# Patient Record
Sex: MALE | Race: BLACK OR AFRICAN AMERICAN | URBAN - METROPOLITAN AREA
[De-identification: names, ages, dates, MRNs, and addresses within clinical notes are randomized per-mention and may not be internally consistent; named-entity substitution may affect disease eponyms.]

---

## 2024-10-17 ENCOUNTER — TELEPHONE (OUTPATIENT)
Age: 44
End: 2024-10-17

## 2024-10-17 NOTE — TELEPHONE ENCOUNTER
Referral for excessive gas. Please review and advise.      Referral  Referral # [id number]  Referral Information    Referral # Creation Date Referral Status Status Update    [id number] 10/17/2024 Pending Review 10/17/2024: Status History     Status Reason Referral Type Referral Reasons Referral Class   Pending Physician Review Eval and Treat Specialty Services Required Incoming     To Specialty To Provider To Location/Place of Service To Department   Gastroenterology Chris Cartagena MD none [facility] - GASTROENTEROLOGY     To Vendor Referred By By Location/Place of Service By Department   none Martin Panchal MD none none     Priority Start Date Expiration Date Referral Entered By   Routine 10/17/2024 10/17/2025 Kisha Elizabeth MA     Visits Requested Visits Authorized Visits Completed Visits Scheduled   2 2       Coverages    Payer Plan Auth. Required? Covered? Member # Authorized From Expires Auth # Precert. # Comment   [employer] [facility] [place] [employer] [facility] [place] [id number] 2/1/2022 -- -- -- --     Referral Information    Referral # Creation Date Referral Status Status Update    [id number] 10/17/2024 Pending Review 10/17/2024: Status History     Status Reason Referral Type Referral Reasons Referral Class   Pending Physician Review Eval and Treat Specialty Services Required Incoming     To Specialty To Provider To Location/Place of Service To Department   Gastroenterology Chris Cartagena MD none [facility] - GASTROENTEROLOGY     To Vendor Referred By By Location/Place of Service By Department   none Martin Panchal MD none none     Priority Start Date Expiration Date Referral Entered By   Routine 10/17/2024 10/17/2025 Kisha Elizabeth MA     Visits Requested Visits Authorized Visits Completed Visits Scheduled   2 2       Procedure Information    Service Details  Procedure Modifiers Provider Requested Approved   REF25 - AMB REFERRAL TO